# Patient Record
Sex: MALE | Race: BLACK OR AFRICAN AMERICAN
[De-identification: names, ages, dates, MRNs, and addresses within clinical notes are randomized per-mention and may not be internally consistent; named-entity substitution may affect disease eponyms.]

---

## 2022-02-23 ENCOUNTER — APPOINTMENT (OUTPATIENT)
Dept: ENDOCRINOLOGY | Facility: CLINIC | Age: 62
End: 2022-02-23
Payer: COMMERCIAL

## 2022-02-23 VITALS
TEMPERATURE: 97.2 F | OXYGEN SATURATION: 98 % | HEART RATE: 66 BPM | BODY MASS INDEX: 27.35 KG/M2 | DIASTOLIC BLOOD PRESSURE: 82 MMHG | HEIGHT: 70 IN | SYSTOLIC BLOOD PRESSURE: 124 MMHG | WEIGHT: 191 LBS

## 2022-02-23 DIAGNOSIS — Z78.9 OTHER SPECIFIED HEALTH STATUS: ICD-10-CM

## 2022-02-23 DIAGNOSIS — M88.9 OSTEITIS DEFORMANS OF UNSPECIFIED BONE: ICD-10-CM

## 2022-02-23 DIAGNOSIS — Z83.3 FAMILY HISTORY OF DIABETES MELLITUS: ICD-10-CM

## 2022-02-23 DIAGNOSIS — Z82.49 FAMILY HISTORY OF ISCHEMIC HEART DISEASE AND OTHER DISEASES OF THE CIRCULATORY SYSTEM: ICD-10-CM

## 2022-02-23 PROCEDURE — 99205 OFFICE O/P NEW HI 60 MIN: CPT

## 2022-02-23 RX ORDER — ALFUZOSIN HYDROCHLORIDE 10 MG/1
10 TABLET, EXTENDED RELEASE ORAL DAILY
Refills: 0 | Status: ACTIVE | COMMUNITY
Start: 2022-02-23

## 2022-02-25 LAB
25(OH)D3 SERPL-MCNC: 34.8 NG/ML
ALBUMIN SERPL ELPH-MCNC: 4.7 G/DL
ALP BLD-CCNC: 248 U/L
ALT SERPL-CCNC: 23 U/L
ANION GAP SERPL CALC-SCNC: 14 MMOL/L
AST SERPL-CCNC: 22 U/L
BILIRUB SERPL-MCNC: 0.5 MG/DL
BUN SERPL-MCNC: 20 MG/DL
CALCIUM SERPL-MCNC: 10.1 MG/DL
CALCIUM SERPL-MCNC: 10.1 MG/DL
CHLORIDE SERPL-SCNC: 103 MMOL/L
CO2 SERPL-SCNC: 25 MMOL/L
CREAT SERPL-MCNC: 1.72 MG/DL
GLUCOSE SERPL-MCNC: 101 MG/DL
PARATHYROID HORMONE INTACT: 43 PG/ML
POTASSIUM SERPL-SCNC: 4.6 MMOL/L
PROT SERPL-MCNC: 6.8 G/DL
SODIUM SERPL-SCNC: 142 MMOL/L

## 2022-02-25 RX ORDER — VALACYCLOVIR 500 MG/1
500 TABLET, FILM COATED ORAL
Qty: 90 | Refills: 0 | Status: COMPLETED | COMMUNITY
Start: 2020-12-26

## 2022-02-25 RX ORDER — CLINDAMYCIN HYDROCHLORIDE 300 MG/1
300 CAPSULE ORAL
Qty: 20 | Refills: 0 | Status: COMPLETED | COMMUNITY
Start: 2022-02-14

## 2022-02-25 RX ORDER — LATANOPROST/PF 0.005 %
0.01 DROPS OPHTHALMIC (EYE)
Qty: 2 | Refills: 0 | Status: ACTIVE | COMMUNITY
Start: 2020-11-05

## 2022-02-25 RX ORDER — MELOXICAM 15 MG/1
15 TABLET ORAL
Qty: 30 | Refills: 0 | Status: ACTIVE | COMMUNITY
Start: 2021-09-16

## 2022-02-25 RX ORDER — AMOXICILLIN 500 MG/1
500 CAPSULE ORAL
Qty: 30 | Refills: 0 | Status: COMPLETED | COMMUNITY
Start: 2021-09-16

## 2022-02-25 RX ORDER — TESTOSTERONE 75 MG/1
PELLET SUBCUTANEOUS
Refills: 0 | Status: ACTIVE | COMMUNITY

## 2022-02-25 NOTE — ASSESSMENT
[FreeTextEntry1] : 60 yo male presents for evaluation of Paget's Disease of the bone. Previously seen here in 2010. H/o BPH and ATN.\par \par History of Paget's disease since 2000 with elevated alkaline phosphatase levels and bone scan showing involvement of the hip and pelvis. Was previously treated with Pamidronate in early 2000's and IV Reclast 5 mg around 2010 with improvement of pain symptoms. At the time alk phos was in the 300 range?. \par \par Now with intermittent left hip pain and stiffness for past 6 months. Xray of left hip (09/2021) demonstrates Paget's. MRI of the left hip (2021) did not mention Paget's. Denies FHx of Paget's disease of the bone. Denies any falls or fractures. Denies bony deformities, headache, hearing changes, or radiation of pain.\par Options of medical therapy versus observation discussed in great detail.  I have recommended medical therapy because of symptoms of hip pain.  I do not know if the pain is directly related to Paget's or may be other issues such as arthritis but this may respond to Paget's treatment.\par Discussed treatment options including IV Reclast. Risks and benefits of IV Reclast discussed with the pt, including ONJ, atypical femur fx, hypocalcemia, and acute and transient mild to moderate flu-like symptoms in first three days following treatment initiation. Pt elects to have IV Reclast. Will check labs including renal function prior to initiating treatment. Will check CMP, intact PTH, bone specific ALP, CTX, vitamin D level.\par \par Patient will send copy of recent MRI results.\par Recent labs reviewed (06/2021): eGFR 43, creatinine 1.68, .\par \par Will contact patient's care team in [place] if pt opts to have IV Reclast infusion there.\par Will f/u with lab results. \par \par Seen and d/w Dr. Garcia.\par Priscilla Dow NP (Brenda)  Endocrine Fellow

## 2022-02-25 NOTE — CONSULT LETTER
[Dear  ___] : Dear  [unfilled], [Consult Letter:] : I had the pleasure of evaluating your patient, [unfilled]. [Please see my note below.] : Please see my note below. [Consult Closing:] : Thank you very much for allowing me to participate in the care of this patient.  If you have any questions, please do not hesitate to contact me. [FreeTextEntry2] : Jemal Olson MD\par [id number]par [place] [id number]

## 2022-02-25 NOTE — HISTORY OF PRESENT ILLNESS
[FreeTextEntry1] : 62 yo M presents for evaluation of Paget's disease. \par \par As per previous notes, patient was diagnosed in 2000 with Paget's disease based on elevated alkaline phosphatase level, and had bone scan at the time showing involvement in the hip and pelvis. He had two doses of pamidronate.  Patient was last seen here in 2010 for consultation and follow-up, with pain throughout the hip and groin, and was treated with IV Reclast x 1 dose. At the time alk phos was in the 300 range as per pt. \par \par Presently, patient reports return of intermittent pain in the left hip and pelvis in the past 6 months. Reports more stiffness than achiness, and mild decrease in mobility especially when bending down. Pain is worsened with change in position from prone to sitting. Denies injury or trauma, however likes to exercise regularly. \par Reports his PCP in [place] has been following his alkaline phosphatase levels over the years and patient has noticed a gradual increase and now elevation in alk phos levels. Had xray of left hip (09/2021) demonstrating Paget's. Also had recent MRI of the left hip (2021) which did not mention Paget's. \par Denies FHx of Paget's disease of the bone. Denies any falls or fractures. Denies bony deformities, headache, hearing changes, or radiation of pain. Was seen by dentist last week, possible future extraction but no immediate plans.\par \par Patient also reports low testosterone, currently being treated with testosterone implant pellets by male reproductive specialist in [place]. \par \par PMH: acute tubular necrosis (2015), BPH, Paget's disease of the bone

## 2022-02-28 LAB — COLLAGEN CTX SERPL-MCNC: 409 PG/ML

## 2022-03-02 LAB — ALP BONE SERPL-MCNC: 81 UG/L

## 2024-12-10 NOTE — PHYSICAL EXAM
No [Alert] : alert [Healthy Appearance] : healthy appearance [No Acute Distress] : no acute distress [Normal Sclera/Conjunctiva] : normal sclera/conjunctiva [EOMI] : extra ocular movement intact [No Neck Mass] : no neck mass was observed [Thyroid Not Enlarged] : the thyroid was not enlarged [No Respiratory Distress] : no respiratory distress [No Accessory Muscle Use] : no accessory muscle use [Clear to Auscultation] : lungs were clear to auscultation bilaterally [Normal S1, S2] : normal S1 and S2 [Normal Rate] : heart rate was normal [Regular Rhythm] : with a regular rhythm [No Edema] : no peripheral edema [Normal Bowel Sounds] : normal bowel sounds [Not Tender] : non-tender [Soft] : abdomen soft [Normal Anterior Cervical Nodes] : no anterior cervical lymphadenopathy [Normal Posterior Cervical Nodes] : no posterior cervical lymphadenopathy [No Spinal Tenderness] : no spinal tenderness [Normal Gait] : normal gait [No Joint Swelling] : no joint swelling seen [Normal Strength/Tone] : muscle strength and tone were normal [No Rash] : no rash [Normal Reflexes] : deep tendon reflexes were 2+ and symmetric [No Tremors] : no tremors [Oriented x3] : oriented to person, place, and time